# Patient Record
Sex: MALE | Race: WHITE | NOT HISPANIC OR LATINO | URBAN - METROPOLITAN AREA
[De-identification: names, ages, dates, MRNs, and addresses within clinical notes are randomized per-mention and may not be internally consistent; named-entity substitution may affect disease eponyms.]

---

## 2017-01-31 ENCOUNTER — OFFICE (OUTPATIENT)
Dept: URBAN - METROPOLITAN AREA CLINIC 11 | Facility: CLINIC | Age: 52
End: 2017-01-31
Payer: COMMERCIAL

## 2017-01-31 VITALS
SYSTOLIC BLOOD PRESSURE: 142 MMHG | HEART RATE: 93 BPM | DIASTOLIC BLOOD PRESSURE: 99 MMHG | HEIGHT: 72 IN | WEIGHT: 293 LBS

## 2017-01-31 DIAGNOSIS — K85.90 ACUTE PANCREATITIS WITHOUT NECROSIS OR INFECTION, UNSPECIFIE: ICD-10-CM

## 2017-01-31 DIAGNOSIS — R93.2 ABNORMAL FINDINGS ON DIAGNOSTIC IMAGING OF LIVER AND BILIARY: ICD-10-CM

## 2017-01-31 PROCEDURE — 99204 OFFICE O/P NEW MOD 45 MIN: CPT | Performed by: INTERNAL MEDICINE

## 2017-01-31 RX ORDER — SODIUM PICOSULFATE, MAGNESIUM OXIDE, AND ANHYDROUS CITRIC ACID 10; 3.5; 12 MG/16.1G; G/16.1G; G/16.1G
POWDER, METERED ORAL
Qty: 1 | Refills: 0 | Status: ACTIVE
Start: 2017-01-31

## 2017-01-31 NOTE — SERVICEHPINOTES
He presents for a f/u evaluation after having a possible CBD stone and pancreatitis this last August.   He had a lap jesica with Dr. Zhang after having acute abdominal pain in July.  He did well until the end of August when he again had acute abdominal pain.  He was seen at [facility] and was found to have pancreatitis.  He was told that there was a CBD stone.  He was treated without apparent surgical intervention or ERCP/MRCP.  He has not had further abdominal pain issues, n/v, or such.  He has been avoiding Gluten and dairy products thinking that it might help.  He had not had prior episodes of pancreatitis.He had a lab band in about 2007 placed by Dr. Hammer.  He has not had f/u in years for this.He has not had a colonoscopy or eval for colon cancer screening, as yet.  He did not have a family history of colon cancer or colon polyps.

## 2017-01-31 NOTE — SERVICENOTES
We discussed his prior gall stones, pancreatitis following the lap jesica and that this may have been a bit of biliary debris.  He has a lap band and an MRI was not done at the time.  I would like to do a f/u CT noting the marked thickening of the pancreas with resultant partial gastric outlet obstruction.  We discusses other causes of pancreatitits and labs have been ordered.  We dsicussed the difficulties of a possible ERCP with his lab band.  We also discussed the potential removal of the band in the future and potential of migration over time.  We discussed the need for colon cancer screening as well.  His records in [facility] were reivewed with him.